# Patient Record
Sex: MALE | Race: WHITE | NOT HISPANIC OR LATINO
[De-identification: names, ages, dates, MRNs, and addresses within clinical notes are randomized per-mention and may not be internally consistent; named-entity substitution may affect disease eponyms.]

---

## 2018-07-25 ENCOUNTER — RECORD ABSTRACTING (OUTPATIENT)
Age: 83
End: 2018-07-25

## 2018-07-25 DIAGNOSIS — Z85.46 PERSONAL HISTORY OF MALIGNANT NEOPLASM OF PROSTATE: ICD-10-CM

## 2018-07-30 ENCOUNTER — RX RENEWAL (OUTPATIENT)
Age: 83
End: 2018-07-30

## 2018-08-15 ENCOUNTER — APPOINTMENT (OUTPATIENT)
Dept: PULMONOLOGY | Facility: CLINIC | Age: 83
End: 2018-08-15
Payer: MEDICARE

## 2018-08-15 VITALS
SYSTOLIC BLOOD PRESSURE: 166 MMHG | RESPIRATION RATE: 14 BRPM | WEIGHT: 220 LBS | HEIGHT: 70 IN | HEART RATE: 57 BPM | OXYGEN SATURATION: 96 % | DIASTOLIC BLOOD PRESSURE: 88 MMHG | BODY MASS INDEX: 31.5 KG/M2

## 2018-08-15 DIAGNOSIS — G47.10 HYPERSOMNIA, UNSPECIFIED: ICD-10-CM

## 2018-08-15 DIAGNOSIS — G47.30 HYPERSOMNIA, UNSPECIFIED: ICD-10-CM

## 2018-08-15 PROCEDURE — 94726 PLETHYSMOGRAPHY LUNG VOLUMES: CPT

## 2018-08-15 PROCEDURE — 99214 OFFICE O/P EST MOD 30 MIN: CPT | Mod: 25

## 2018-08-15 PROCEDURE — 94060 EVALUATION OF WHEEZING: CPT

## 2018-08-15 PROCEDURE — 94729 DIFFUSING CAPACITY: CPT

## 2018-08-15 RX ORDER — PREDNISONE 5 MG/1
5 TABLET ORAL
Qty: 60 | Refills: 0 | Status: ACTIVE | COMMUNITY
Start: 2018-02-14

## 2018-08-15 RX ORDER — OMEPRAZOLE 40 MG/1
40 CAPSULE, DELAYED RELEASE ORAL
Qty: 90 | Refills: 0 | Status: ACTIVE | COMMUNITY
Start: 2018-03-05

## 2018-08-15 RX ORDER — ABIRATERONE ACETATE 250 MG/1
250 TABLET ORAL
Qty: 120 | Refills: 0 | Status: ACTIVE | COMMUNITY
Start: 2018-01-19

## 2018-08-15 RX ORDER — FAMOTIDINE 40 MG/1
40 TABLET, FILM COATED ORAL
Qty: 30 | Refills: 0 | Status: DISCONTINUED | COMMUNITY
Start: 2018-01-24

## 2018-08-15 RX ORDER — BUDESONIDE 0.5 MG/2ML
0.5 INHALANT ORAL
Qty: 60 | Refills: 0 | Status: DISCONTINUED | COMMUNITY
Start: 2017-10-31

## 2018-08-15 RX ORDER — CRANBERRY FRUIT EXTRACT 650 MG
CAPSULE ORAL
Refills: 0 | Status: ACTIVE | COMMUNITY

## 2018-08-15 RX ORDER — TERAZOSIN 10 MG/1
10 CAPSULE ORAL
Qty: 90 | Refills: 0 | Status: DISCONTINUED | COMMUNITY
Start: 2018-06-07

## 2018-08-15 RX ORDER — GLUC/MSM/COLGN2/HYAL/ANTIARTH3 375-375-20
TABLET ORAL
Refills: 0 | Status: ACTIVE | COMMUNITY

## 2018-08-15 RX ORDER — DONEPEZIL HYDROCHLORIDE 10 MG/1
10 TABLET ORAL
Qty: 90 | Refills: 0 | Status: DISCONTINUED | COMMUNITY
Start: 2018-06-19

## 2018-08-19 ENCOUNTER — RX RENEWAL (OUTPATIENT)
Age: 83
End: 2018-08-19

## 2018-09-18 ENCOUNTER — RX RENEWAL (OUTPATIENT)
Age: 83
End: 2018-09-18

## 2018-10-09 ENCOUNTER — RX RENEWAL (OUTPATIENT)
Age: 83
End: 2018-10-09

## 2018-11-04 ENCOUNTER — RX RENEWAL (OUTPATIENT)
Age: 83
End: 2018-11-04

## 2018-11-30 ENCOUNTER — RX RENEWAL (OUTPATIENT)
Age: 83
End: 2018-11-30

## 2019-01-29 ENCOUNTER — RX RENEWAL (OUTPATIENT)
Age: 84
End: 2019-01-29

## 2019-02-13 ENCOUNTER — APPOINTMENT (OUTPATIENT)
Dept: PULMONOLOGY | Facility: CLINIC | Age: 84
End: 2019-02-13
Payer: MEDICARE

## 2019-02-13 VITALS
WEIGHT: 227 LBS | SYSTOLIC BLOOD PRESSURE: 127 MMHG | HEIGHT: 70 IN | RESPIRATION RATE: 14 BRPM | BODY MASS INDEX: 32.5 KG/M2 | OXYGEN SATURATION: 96 % | DIASTOLIC BLOOD PRESSURE: 88 MMHG | HEART RATE: 81 BPM

## 2019-02-13 PROCEDURE — 94618 PULMONARY STRESS TESTING: CPT

## 2019-02-13 PROCEDURE — 94060 EVALUATION OF WHEEZING: CPT | Mod: 59

## 2019-02-13 PROCEDURE — 71046 X-RAY EXAM CHEST 2 VIEWS: CPT

## 2019-02-13 PROCEDURE — 94727 GAS DIL/WSHOT DETER LNG VOL: CPT

## 2019-02-13 PROCEDURE — 94729 DIFFUSING CAPACITY: CPT

## 2019-02-13 PROCEDURE — 99214 OFFICE O/P EST MOD 30 MIN: CPT | Mod: 25

## 2019-02-13 NOTE — PHYSICAL EXAM
[General Appearance - Well Developed] : well developed [Normal Appearance] : normal appearance [Well Groomed] : well groomed [General Appearance - Well Nourished] : well nourished [No Deformities] : no deformities [General Appearance - In No Acute Distress] : no acute distress [Normal Conjunctiva] : the conjunctiva exhibited no abnormalities [Eyelids - No Xanthelasma] : the eyelids demonstrated no xanthelasmas [Normal Oropharynx] : abnormal oropharynx [Low Lying Soft Palate] : low lying soft palate [Elongated Uvula] : elongated uvula [Enlarged Base of the Tongue] : enlargement of the base of the tongue [Heart Rate And Rhythm] : heart rate and rhythm were normal [Heart Sounds] : normal S1 and S2 [Murmurs] : no murmurs present [Respiration, Rhythm And Depth] : normal respiratory rhythm and effort [Exaggerated Use Of Accessory Muscles For Inspiration] : no accessory muscle use [Auscultation Breath Sounds / Voice Sounds] : lungs were clear to auscultation bilaterally [Abdomen Soft] : soft [Abdomen Tenderness] : non-tender [Abdomen Mass (___ Cm)] : no abdominal mass palpated [Abnormal Walk] : normal gait [Gait - Sufficient For Exercise Testing] : the gait was sufficient for exercise testing [Skin Color & Pigmentation] : normal skin color and pigmentation [] : no rash [No Venous Stasis] : no venous stasis [Skin Lesions] : no skin lesions [No Skin Ulcers] : no skin ulcer [No Xanthoma] : no  xanthoma was observed [Deep Tendon Reflexes (DTR)] : deep tendon reflexes were 2+ and symmetric [Sensation] : the sensory exam was normal to light touch and pinprick [No Focal Deficits] : no focal deficits

## 2019-02-13 NOTE — PROCEDURE
[FreeTextEntry1] : Chest x-ray clear\par PFT minimal abnormalities.\par Exercise study shows mild oxygen desaturation with exertion

## 2019-02-13 NOTE — HISTORY OF PRESENT ILLNESS
[FreeTextEntry1] : Patient here for followup. 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 Chronic exertional dyspnea without improvement. Has tried inhalers in the past and experienced side effects.\par \par He does have a history of sleep apnea. He refused treatment for this in the past.

## 2019-05-01 ENCOUNTER — RX RENEWAL (OUTPATIENT)
Age: 84
End: 2019-05-01

## 2019-06-24 ENCOUNTER — RX RENEWAL (OUTPATIENT)
Age: 84
End: 2019-06-24

## 2019-07-08 ENCOUNTER — RX RENEWAL (OUTPATIENT)
Age: 84
End: 2019-07-08

## 2019-07-10 ENCOUNTER — APPOINTMENT (OUTPATIENT)
Dept: PULMONOLOGY | Facility: CLINIC | Age: 84
End: 2019-07-10
Payer: MEDICARE

## 2019-07-10 VITALS
DIASTOLIC BLOOD PRESSURE: 75 MMHG | TEMPERATURE: 97.4 F | HEIGHT: 70 IN | OXYGEN SATURATION: 96 % | BODY MASS INDEX: 32.35 KG/M2 | WEIGHT: 226 LBS | SYSTOLIC BLOOD PRESSURE: 136 MMHG | HEART RATE: 71 BPM

## 2019-07-10 DIAGNOSIS — J45.40 MODERATE PERSISTENT ASTHMA, UNCOMPLICATED: ICD-10-CM

## 2019-07-10 LAB — DEPRECATED D DIMER PPP IA-ACNC: <150 NG/ML DDU

## 2019-07-10 PROCEDURE — 94060 EVALUATION OF WHEEZING: CPT

## 2019-07-10 PROCEDURE — 94727 GAS DIL/WSHOT DETER LNG VOL: CPT

## 2019-07-10 PROCEDURE — 94729 DIFFUSING CAPACITY: CPT

## 2019-07-10 PROCEDURE — 99214 OFFICE O/P EST MOD 30 MIN: CPT | Mod: 25

## 2019-07-10 NOTE — PROCEDURE
[FreeTextEntry1] : PFT normal\par No oxygen desaturation noted with ambulation\par CT chest as noted above

## 2019-07-10 NOTE — HISTORY OF PRESENT ILLNESS
[FreeTextEntry1] : Patient here for evaluation of worsening shortness of breath and abnormal chest CT. History of pulmonary embolism and history of prostate cancer with chronic dyspnea. Dyspnea did not improve after treatment of thromboembolic disease. Had worsening symptoms without clear precipitant although it did occur after a brief prescription for Wellbutrin for depression. Was sent for CT angiogram which revealed new filling defect in right upper lobe.\par \par 2 days ago the patient's anticoagulant was changed from DOAC to Lovenox. No clear improvement in symptoms was noted with this change in fact the patient feels worse.

## 2019-07-10 NOTE — REVIEW OF SYSTEMS
[Dyspnea] : dyspnea [Cough] : no cough [Orthopnea] : orthopnea [As Noted in HPI] : as noted in HPI [Negative] : Sleep Disorder

## 2019-07-10 NOTE — PHYSICAL EXAM
[General Appearance - Well Developed] : well developed [Well Groomed] : well groomed [Normal Appearance] : normal appearance [General Appearance - In No Acute Distress] : no acute distress [No Deformities] : no deformities [General Appearance - Well Nourished] : well nourished [Eyelids - No Xanthelasma] : the eyelids demonstrated no xanthelasmas [Normal Conjunctiva] : the conjunctiva exhibited no abnormalities [Normal Oropharynx] : abnormal oropharynx [Low Lying Soft Palate] : low lying soft palate [Elongated Uvula] : elongated uvula [Heart Rate And Rhythm] : heart rate and rhythm were normal [Enlarged Base of the Tongue] : enlargement of the base of the tongue [Murmurs] : no murmurs present [Heart Sounds] : normal S1 and S2 [Respiration, Rhythm And Depth] : normal respiratory rhythm and effort [Exaggerated Use Of Accessory Muscles For Inspiration] : no accessory muscle use [Abdomen Tenderness] : non-tender [Auscultation Breath Sounds / Voice Sounds] : lungs were clear to auscultation bilaterally [Abdomen Soft] : soft [Abnormal Walk] : normal gait [Abdomen Mass (___ Cm)] : no abdominal mass palpated [Skin Color & Pigmentation] : normal skin color and pigmentation [No Venous Stasis] : no venous stasis [Gait - Sufficient For Exercise Testing] : the gait was sufficient for exercise testing [] : no rash [No Skin Ulcers] : no skin ulcer [Skin Lesions] : no skin lesions [No Xanthoma] : no  xanthoma was observed [Sensation] : the sensory exam was normal to light touch and pinprick [Deep Tendon Reflexes (DTR)] : deep tendon reflexes were 2+ and symmetric [No Focal Deficits] : no focal deficits

## 2019-07-10 NOTE — ASSESSMENT
[FreeTextEntry1] : Worsening shortness of breath etiology unclear. I have requested the CT discs for my review but I doubt pulmonary embolism as the cause of his shortness of breath. Even if it is the issue has been addressed by changing anticoagulant to Lovenox. Will obtain d-dimer and BNP and labs. Recommend reassessment by cardiology. If symptoms worsen recommend hospitalization

## 2019-07-11 LAB
ALBUMIN SERPL ELPH-MCNC: 4.1 G/DL
ALP BLD-CCNC: 43 U/L
ALT SERPL-CCNC: 11 U/L
ANION GAP SERPL CALC-SCNC: 11 MMOL/L
AST SERPL-CCNC: 16 U/L
BASOPHILS # BLD AUTO: 0.03 K/UL
BASOPHILS NFR BLD AUTO: 0.5 %
BILIRUB SERPL-MCNC: 0.9 MG/DL
BUN SERPL-MCNC: 16 MG/DL
CALCIUM SERPL-MCNC: 9.2 MG/DL
CHLORIDE SERPL-SCNC: 104 MMOL/L
CO2 SERPL-SCNC: 27 MMOL/L
CREAT SERPL-MCNC: 0.97 MG/DL
EOSINOPHIL # BLD AUTO: 0.1 K/UL
EOSINOPHIL NFR BLD AUTO: 1.8 %
GLUCOSE SERPL-MCNC: 153 MG/DL
HCT VFR BLD CALC: 42.4 %
HGB BLD-MCNC: 13.8 G/DL
IMM GRANULOCYTES NFR BLD AUTO: 0.2 %
LYMPHOCYTES # BLD AUTO: 0.47 K/UL
LYMPHOCYTES NFR BLD AUTO: 8.3 %
MAN DIFF?: NORMAL
MCHC RBC-ENTMCNC: 30.3 PG
MCHC RBC-ENTMCNC: 32.5 GM/DL
MCV RBC AUTO: 93.2 FL
MONOCYTES # BLD AUTO: 0.44 K/UL
MONOCYTES NFR BLD AUTO: 7.8 %
NEUTROPHILS # BLD AUTO: 4.59 K/UL
NEUTROPHILS NFR BLD AUTO: 81.4 %
NT-PROBNP SERPL-MCNC: 589 PG/ML
PLATELET # BLD AUTO: 137 K/UL
POTASSIUM SERPL-SCNC: 4 MMOL/L
PROT SERPL-MCNC: 6.2 G/DL
RBC # BLD: 4.55 M/UL
RBC # FLD: 15.4 %
SODIUM SERPL-SCNC: 142 MMOL/L
WBC # FLD AUTO: 5.64 K/UL

## 2019-07-18 ENCOUNTER — RX RENEWAL (OUTPATIENT)
Age: 84
End: 2019-07-18

## 2019-07-19 ENCOUNTER — RX RENEWAL (OUTPATIENT)
Age: 84
End: 2019-07-19

## 2019-07-19 RX ORDER — RIVAROXABAN 20 MG/1
20 TABLET, FILM COATED ORAL
Qty: 30 | Refills: 0 | Status: DISCONTINUED | COMMUNITY
Start: 2018-03-05 | End: 2019-07-19

## 2019-07-23 ENCOUNTER — APPOINTMENT (OUTPATIENT)
Dept: CARDIOLOGY | Facility: CLINIC | Age: 84
End: 2019-07-23
Payer: MEDICARE

## 2019-07-23 ENCOUNTER — NON-APPOINTMENT (OUTPATIENT)
Age: 84
End: 2019-07-23

## 2019-07-23 VITALS
BODY MASS INDEX: 31.35 KG/M2 | OXYGEN SATURATION: 95 % | HEART RATE: 84 BPM | WEIGHT: 219 LBS | SYSTOLIC BLOOD PRESSURE: 131 MMHG | DIASTOLIC BLOOD PRESSURE: 83 MMHG | HEIGHT: 70 IN

## 2019-07-23 PROCEDURE — 99205 OFFICE O/P NEW HI 60 MIN: CPT

## 2019-07-23 PROCEDURE — 93000 ELECTROCARDIOGRAM COMPLETE: CPT

## 2019-07-23 RX ORDER — ROSUVASTATIN CALCIUM 5 MG/1
5 TABLET, FILM COATED ORAL
Refills: 0 | Status: ACTIVE | COMMUNITY

## 2019-07-23 NOTE — HISTORY OF PRESENT ILLNESS
[FreeTextEntry1] : Juan Diego Mercado presented to the office today for a cardiovascular evaluation. He was last seen in .\par \par He is now 84 years old, with a history of smoking in the past. He does have a history of asbestos exposure. He has a history of coronary artery disease, status post PCI in .  At that time, his wife had just recently , and he had multiple symptoms including a sense of dyspnea, dizziness and malaise. He was evaluated at the hospital, where cardiac catheterization was performed. He was found to have double vessel CAD, for which double vessel PCI of the RCA and LAD was performed.  Catheterization was again performed in November, which revealed that the stents were patent.  At some point, given his shortness of breath, he saw a pulmonologist. More recently he has been seeing Dr. Tubbs.\par \par He has continued to experience shortness of breath since then, without any significant change. If anything, over the years, there has been some worsening. Because of a tendency toward hypoglycemia, he has been eating so as to maintain his blood sugar. He has gained perhaps 40 pounds in the last 2 years. His dyspnea with activity, and with bending over, have both increased over that period of time, at least to a certain extent. He denies orthopnea, PND and lower extremity edema. He denies palpitations, dizziness and syncope.\par \par When he saw me in , he reported dyspnea, progressive over the years. PCI could not meaningfully change the course of his dyspnea. At that time, I recommended an echocardiogram and nuclear stress testing. These did not reveal any significant abnormalities.  I decided that we would likely going to require cardiac catheterization to settle things more definitively, if he did not improve.\par \par He has been found to have pulmonary emboli recently, with a more recent pulmonary embolism despite the use of Xarelto. He has therefore been taking Lovenox. She does not report any clear angina, though his history is vague. He denies orthopnea, PND and edema. He denies palpitations, dizziness and syncope. He reports that he has dyspnea walking even 15 feet, or 2 steps in a flight of stairs. His pulmonologist does not believe that his symptoms are pulmonary in origin. He has not been told of atrial fibrillation.

## 2019-07-23 NOTE — PHYSICAL EXAM
[General Appearance - Well Developed] : well developed [Normal Appearance] : normal appearance [Well Groomed] : well groomed [General Appearance - Well Nourished] : well nourished [No Deformities] : no deformities [General Appearance - In No Acute Distress] : no acute distress [Normal Conjunctiva] : the conjunctiva exhibited no abnormalities [Eyelids - No Xanthelasma] : the eyelids demonstrated no xanthelasmas [Normal Oral Mucosa] : normal oral mucosa [No Oral Pallor] : no oral pallor [No Oral Cyanosis] : no oral cyanosis [Normal Jugular Venous V Waves Present] : normal jugular venous V waves present [Normal Jugular Venous A Waves Present] : normal jugular venous A waves present [No Jugular Venous Lyn A Waves] : no jugular venous lyn A waves [Respiration, Rhythm And Depth] : normal respiratory rhythm and effort [Exaggerated Use Of Accessory Muscles For Inspiration] : no accessory muscle use [Auscultation Breath Sounds / Voice Sounds] : lungs were clear to auscultation bilaterally [Abdomen Tenderness] : non-tender [Abdomen Soft] : soft [Abdomen Mass (___ Cm)] : no abdominal mass palpated [FreeTextEntry1] : Obese [Abnormal Walk] : normal gait [Gait - Sufficient For Exercise Testing] : the gait was sufficient for exercise testing [Nail Clubbing] : no clubbing of the fingernails [Cyanosis, Localized] : no localized cyanosis [Petechial Hemorrhages (___cm)] : no petechial hemorrhages [Skin Color & Pigmentation] : normal skin color and pigmentation [] : no rash [No Skin Ulcers] : no skin ulcer [No Venous Stasis] : no venous stasis [Skin Lesions] : no skin lesions [No Xanthoma] : no  xanthoma was observed [Oriented To Time, Place, And Person] : oriented to person, place, and time [Affect] : the affect was normal [Mood] : the mood was normal [No Anxiety] : not feeling anxious [Not Palpable] : not palpable [Normal Rate] : normal [Rhythm Regular] : regular [Normal S1] : normal S1 [Normal S2] : normal S2 [No Gallop] : no gallop heard [S3] : no S3 [S4] : no S4 [No Murmur] : no murmurs heard [Left Carotid Bruit] : no bruit heard over the left carotid [Right Carotid Bruit] : no bruit heard over the right carotid [Left Femoral Bruit] : no bruit heard over the left femoral artery [Right Femoral Bruit] : no bruit heard over the right femoral artery [2+] : right 2+ [No Pitting Edema] : no pitting edema present [Bruit] : no bruit heard

## 2019-07-23 NOTE — REASON FOR VISIT
[Consultation] : a consultation regarding [Coronary Artery Disease] : coronary artery disease [Dyspnea] : dyspnea

## 2019-07-23 NOTE — DISCUSSION/SUMMARY
[FreeTextEntry1] : Juan Diego has a history of coronary artery disease. He has had a history of obesity, which has been progressive. He has shortness of breath which has also been progressive.\par \par It remains unclear what the source of his shortness of breath is.  It may be a combination of obesity, deconditioning, pulmonary disease and structural heart disease. Certainly, it sounds as if revascularization did not help him in any material way, at least from back in 2012.  It isn't clear that this is all the same process.\par \par He is in atrial fibrillation, which may or may not be new. It is certainly new since 2015. I have suggested a Holter, as well as repeating his echocardiogram. I have requested records from his primary care physician as well.\par \par We will need to decide upon ischemic evaluation, which may be best performed either with stress testing or with catheterization. Catheterization may be more desirable, especially if a right heart catheterization is included.  He will perform the echo and Holter, and see me in about 6 weeks.

## 2019-07-24 ENCOUNTER — APPOINTMENT (OUTPATIENT)
Dept: PULMONOLOGY | Facility: CLINIC | Age: 84
End: 2019-07-24
Payer: MEDICARE

## 2019-07-24 VITALS
DIASTOLIC BLOOD PRESSURE: 80 MMHG | HEIGHT: 70 IN | WEIGHT: 221 LBS | BODY MASS INDEX: 31.64 KG/M2 | OXYGEN SATURATION: 97 % | HEART RATE: 82 BPM | SYSTOLIC BLOOD PRESSURE: 132 MMHG

## 2019-07-24 PROCEDURE — 99214 OFFICE O/P EST MOD 30 MIN: CPT

## 2019-07-24 NOTE — REVIEW OF SYSTEMS
[Cough] : no cough [Dyspnea] : dyspnea [As Noted in HPI] : as noted in HPI [Orthopnea] : orthopnea [Negative] : Sleep Disorder

## 2019-07-24 NOTE — REASON FOR VISIT
[Follow-Up] : a follow-up visit [Pulmonary Embolism] : pulmonary embolism [Shortness of Breath] : shortness of Breath

## 2019-07-24 NOTE — HISTORY OF PRESENT ILLNESS
[FreeTextEntry1] : PRIOR: Patient here for evaluation of worsening shortness of breath and abnormal chest CT. History of pulmonary embolism and history of prostate cancer with chronic dyspnea. Dyspnea did not improve after treatment of thromboembolic disease. Had worsening symptoms without clear precipitant although it did occur after a brief prescription for Wellbutrin for depression. Was sent for CT angiogram which revealed new filling defect in right upper lobe.\par \par 2 days ago the patient's anticoagulant was changed from DOAC to Lovenox. No clear improvement in symptoms was noted with this change in fact the patient feels worse.\par \par CURRENT: No change in dyspnea with change in anticoagulant. Remains short of breath with minimal exertion. Saw cardiology workup in progress found to have atrial fibrillation; possible cardiac catheterization.\par \par

## 2019-07-24 NOTE — ASSESSMENT
[FreeTextEntry1] : Do not have full explanation for worsening dyspnea. May be combination of hormonal effects as well as deconditioning. Will have CT chest reviewed to see if indeed there was a new pulmonary embolism. If this is negative we'll consider diuretic trial although cardiology not enthusiastic that this will make a difference.

## 2019-07-24 NOTE — PHYSICAL EXAM
[General Appearance - Well Developed] : well developed [Normal Appearance] : normal appearance [Well Groomed] : well groomed [General Appearance - Well Nourished] : well nourished [No Deformities] : no deformities [General Appearance - In No Acute Distress] : no acute distress [Normal Conjunctiva] : the conjunctiva exhibited no abnormalities [Eyelids - No Xanthelasma] : the eyelids demonstrated no xanthelasmas [Normal Oropharynx] : abnormal oropharynx [Low Lying Soft Palate] : low lying soft palate [Elongated Uvula] : elongated uvula [Enlarged Base of the Tongue] : enlargement of the base of the tongue [Heart Sounds] : normal S1 and S2 [Heart Rate And Rhythm] : heart rate and rhythm were normal [Murmurs] : no murmurs present [Exaggerated Use Of Accessory Muscles For Inspiration] : no accessory muscle use [Respiration, Rhythm And Depth] : normal respiratory rhythm and effort [Auscultation Breath Sounds / Voice Sounds] : lungs were clear to auscultation bilaterally [Abdomen Soft] : soft [Abdomen Tenderness] : non-tender [Abdomen Mass (___ Cm)] : no abdominal mass palpated [Abnormal Walk] : normal gait [Gait - Sufficient For Exercise Testing] : the gait was sufficient for exercise testing [] : no rash [Skin Color & Pigmentation] : normal skin color and pigmentation [No Venous Stasis] : no venous stasis [Skin Lesions] : no skin lesions [No Xanthoma] : no  xanthoma was observed [No Skin Ulcers] : no skin ulcer [Deep Tendon Reflexes (DTR)] : deep tendon reflexes were 2+ and symmetric [Sensation] : the sensory exam was normal to light touch and pinprick [No Focal Deficits] : no focal deficits

## 2019-08-01 ENCOUNTER — APPOINTMENT (OUTPATIENT)
Dept: CARDIOLOGY | Facility: CLINIC | Age: 84
End: 2019-08-01

## 2019-08-02 ENCOUNTER — APPOINTMENT (OUTPATIENT)
Dept: CARDIOLOGY | Facility: CLINIC | Age: 84
End: 2019-08-02
Payer: MEDICARE

## 2019-08-02 PROCEDURE — 93306 TTE W/DOPPLER COMPLETE: CPT

## 2019-08-07 ENCOUNTER — APPOINTMENT (OUTPATIENT)
Dept: PULMONOLOGY | Facility: CLINIC | Age: 84
End: 2019-08-07

## 2019-08-21 ENCOUNTER — APPOINTMENT (OUTPATIENT)
Dept: CARDIOLOGY | Facility: CLINIC | Age: 84
End: 2019-08-21
Payer: MEDICARE

## 2019-08-21 ENCOUNTER — APPOINTMENT (OUTPATIENT)
Dept: PULMONOLOGY | Facility: CLINIC | Age: 84
End: 2019-08-21
Payer: MEDICARE

## 2019-08-21 VITALS
WEIGHT: 221 LBS | BODY MASS INDEX: 31.64 KG/M2 | HEIGHT: 70 IN | SYSTOLIC BLOOD PRESSURE: 119 MMHG | DIASTOLIC BLOOD PRESSURE: 71 MMHG | HEART RATE: 86 BPM | OXYGEN SATURATION: 95 %

## 2019-08-21 PROCEDURE — 99214 OFFICE O/P EST MOD 30 MIN: CPT

## 2019-08-21 NOTE — PHYSICAL EXAM
[Normal Appearance] : normal appearance [General Appearance - Well Developed] : well developed [Well Groomed] : well groomed [General Appearance - Well Nourished] : well nourished [No Deformities] : no deformities [Normal Conjunctiva] : the conjunctiva exhibited no abnormalities [General Appearance - In No Acute Distress] : no acute distress [Eyelids - No Xanthelasma] : the eyelids demonstrated no xanthelasmas [Normal Oropharynx] : abnormal oropharynx [Low Lying Soft Palate] : low lying soft palate [Elongated Uvula] : elongated uvula [Enlarged Base of the Tongue] : enlargement of the base of the tongue [Heart Rate And Rhythm] : heart rate and rhythm were normal [Murmurs] : no murmurs present [Heart Sounds] : normal S1 and S2 [Respiration, Rhythm And Depth] : normal respiratory rhythm and effort [Exaggerated Use Of Accessory Muscles For Inspiration] : no accessory muscle use [Auscultation Breath Sounds / Voice Sounds] : lungs were clear to auscultation bilaterally [Abdomen Tenderness] : non-tender [Abdomen Soft] : soft [Abdomen Mass (___ Cm)] : no abdominal mass palpated [Gait - Sufficient For Exercise Testing] : the gait was sufficient for exercise testing [Abnormal Walk] : normal gait [Skin Color & Pigmentation] : normal skin color and pigmentation [] : no rash [No Skin Ulcers] : no skin ulcer [No Venous Stasis] : no venous stasis [Skin Lesions] : no skin lesions [Deep Tendon Reflexes (DTR)] : deep tendon reflexes were 2+ and symmetric [No Xanthoma] : no  xanthoma was observed [Sensation] : the sensory exam was normal to light touch and pinprick [No Focal Deficits] : no focal deficits

## 2019-08-21 NOTE — ASSESSMENT
[FreeTextEntry1] : Etiology of shortness of breath unclear. Will repeat CT angiography; if evidence of progressive thromboembolic disease will need to change form of anticoagulation once again probably in the context of hospitalization. This is unlikely as there is no evidence of pulmonary hypertension. I think it more likely that his dyspnea has to do with his hormonal  agents for his prostate cancer. He may have a difficult decision to make regarding balancing his symptoms which are disabling versus the effect of treatment for his cancer.

## 2019-08-21 NOTE — HISTORY OF PRESENT ILLNESS
[FreeTextEntry1] : History of prostate cancer and pulmonary embolism. Recent CT chest showed new pulmonary embolism. Patient changed from oral DOAC to enoxaparin. No change in dyspnea reported. Recent cardiac workup negative although workup for arrhythmia still ongoing. Still very short of breath. Tried nebulizer therapy actually felt worse

## 2019-08-27 ENCOUNTER — FORM ENCOUNTER (OUTPATIENT)
Age: 84
End: 2019-08-27

## 2019-08-28 ENCOUNTER — OUTPATIENT (OUTPATIENT)
Dept: OUTPATIENT SERVICES | Facility: HOSPITAL | Age: 84
LOS: 1 days | End: 2019-08-28
Payer: MEDICARE

## 2019-08-28 ENCOUNTER — APPOINTMENT (OUTPATIENT)
Dept: CT IMAGING | Facility: CLINIC | Age: 84
End: 2019-08-28
Payer: MEDICARE

## 2019-08-28 DIAGNOSIS — Z00.8 ENCOUNTER FOR OTHER GENERAL EXAMINATION: ICD-10-CM

## 2019-08-28 PROCEDURE — 82565 ASSAY OF CREATININE: CPT

## 2019-08-28 PROCEDURE — 71275 CT ANGIOGRAPHY CHEST: CPT

## 2019-08-28 PROCEDURE — 71275 CT ANGIOGRAPHY CHEST: CPT | Mod: 26

## 2019-08-30 PROCEDURE — 93224 XTRNL ECG REC UP TO 48 HRS: CPT

## 2019-09-03 ENCOUNTER — NON-APPOINTMENT (OUTPATIENT)
Age: 84
End: 2019-09-03

## 2019-09-03 ENCOUNTER — APPOINTMENT (OUTPATIENT)
Dept: CARDIOLOGY | Facility: CLINIC | Age: 84
End: 2019-09-03
Payer: MEDICARE

## 2019-09-03 ENCOUNTER — OTHER (OUTPATIENT)
Age: 84
End: 2019-09-03

## 2019-09-03 VITALS
HEART RATE: 92 BPM | SYSTOLIC BLOOD PRESSURE: 120 MMHG | DIASTOLIC BLOOD PRESSURE: 76 MMHG | WEIGHT: 218 LBS | HEIGHT: 70 IN | OXYGEN SATURATION: 97 % | BODY MASS INDEX: 31.21 KG/M2

## 2019-09-03 PROCEDURE — 99215 OFFICE O/P EST HI 40 MIN: CPT

## 2019-09-03 PROCEDURE — 93000 ELECTROCARDIOGRAM COMPLETE: CPT

## 2019-09-03 NOTE — PHYSICAL EXAM
[General Appearance - Well Developed] : well developed [Normal Appearance] : normal appearance [Well Groomed] : well groomed [General Appearance - Well Nourished] : well nourished [No Deformities] : no deformities [General Appearance - In No Acute Distress] : no acute distress [Normal Conjunctiva] : the conjunctiva exhibited no abnormalities [Eyelids - No Xanthelasma] : the eyelids demonstrated no xanthelasmas [Normal Oral Mucosa] : normal oral mucosa [No Oral Pallor] : no oral pallor [No Oral Cyanosis] : no oral cyanosis [Normal Jugular Venous A Waves Present] : normal jugular venous A waves present [Normal Jugular Venous V Waves Present] : normal jugular venous V waves present [No Jugular Venous Lyn A Waves] : no jugular venous lyn A waves [Respiration, Rhythm And Depth] : normal respiratory rhythm and effort [Exaggerated Use Of Accessory Muscles For Inspiration] : no accessory muscle use [Auscultation Breath Sounds / Voice Sounds] : lungs were clear to auscultation bilaterally [Abdomen Soft] : soft [Abdomen Tenderness] : non-tender [Abdomen Mass (___ Cm)] : no abdominal mass palpated [FreeTextEntry1] : Obese [Abnormal Walk] : normal gait [Nail Clubbing] : no clubbing of the fingernails [Gait - Sufficient For Exercise Testing] : the gait was sufficient for exercise testing [Cyanosis, Localized] : no localized cyanosis [Petechial Hemorrhages (___cm)] : no petechial hemorrhages [Skin Color & Pigmentation] : normal skin color and pigmentation [] : no rash [No Venous Stasis] : no venous stasis [No Skin Ulcers] : no skin ulcer [Skin Lesions] : no skin lesions [No Xanthoma] : no  xanthoma was observed [Oriented To Time, Place, And Person] : oriented to person, place, and time [Mood] : the mood was normal [Affect] : the affect was normal [No Anxiety] : not feeling anxious [Not Palpable] : not palpable [Normal Rate] : normal [Irregularly Irregular] : irregularly irregular [Normal S1] : normal S1 [Normal S2] : normal S2 [No Gallop] : no gallop heard [S3] : no S3 [S4] : no S4 [No Murmur] : no murmurs heard [Right Carotid Bruit] : no bruit heard over the right carotid [Right Femoral Bruit] : no bruit heard over the right femoral artery [Left Carotid Bruit] : no bruit heard over the left carotid [Left Femoral Bruit] : no bruit heard over the left femoral artery [2+] : left 2+ [Bruit] : no bruit heard [No Pitting Edema] : no pitting edema present

## 2019-09-03 NOTE — HISTORY OF PRESENT ILLNESS
[FreeTextEntry1] : Juan Diego Mercado presented to the office today for a cardiovascular evaluation. He was last seen in the office last month.\par \par He is now 84 years old, with a history of smoking in the past. He does have a history of asbestos exposure. He has a history of coronary artery disease, status post PCI in .  At that time, his wife had just recently , and he had multiple symptoms including a sense of dyspnea, dizziness and malaise. He was evaluated at the hospital, where cardiac catheterization was performed. He was found to have double vessel CAD, for which double vessel PCI of the RCA and LAD was performed.  Catheterization was again performed in November, which revealed that the stents were patent.  At some point, given his shortness of breath, he saw a pulmonologist. More recently he has been seeing Dr. Tubbs, who has been treating incidentally noted venous thromboembolic disease, for which he had been on Xarelto.\par \par When he saw me in , he reported dyspnea, progressive over the years. PCI did not meaningfully change the course of his dyspnea. At that time, I recommended an echocardiogram and nuclear stress testing. These did not reveal any significant abnormalities.  I decided that we were likely going to require cardiac catheterization to settle things more definitively, if he did not improve.\par \par He has been found to have pulmonary emboli recently, with a more recent pulmonary embolism despite the use of Xarelto. He has therefore been taking Lovenox. He does not report any clear angina, though his history is vague. He denies orthopnea, PND and edema. He denies palpitations, dizziness and syncope. He reports that he has dyspnea walking even 15 feet, or 2 steps in a flight of stairs. \par \par At the time, I found him to be in atrial fibrillation, which was a new diagnosis. I recommend a Holter monitor and an echocardiogram. His echocardiogram revealed a normal ejection fraction without significant pulmonary hypertension. Holter monitoring revealed reconsult atrial fibrillation as the predominant rhythm, with slow sinus rhythm occasionally.\par \par He continues to feel short of breath. He's had yet another CT scan recently given the persistent dyspnea, which does not reveal any evidence of pulmonary embolism. He reports severe shortness of breath, even with carving meat at the table. There has been discussion about the possibility that this represents a side effect from his hormonal therapy for prostate cancer.

## 2019-09-03 NOTE — DISCUSSION/SUMMARY
[FreeTextEntry1] : Juan Diego has a history of coronary artery disease. He has had a history of obesity, which has been progressive. He has shortness of breath which has also been progressive.\par \par It remains unclear what the source of his shortness of breath is.  It may be a combination of obesity, deconditioning, pulmonary disease and structural heart disease. Certainly, it sounds as if revascularization did not help him in any material way, at least from back in 2012.  It is still not clear that this is all the same process.\par \par He remains in rate controlled atrial fibrillation. It turns out that this was present at least as far back as December, 2018. \par \par After much discussion, I think it is reasonable to pursue cardiac catheterization. We need to define his coronary anatomy definitively, before we decide that we're going to say his shortness of breath is from Lupron injections. A right and left heart catheterization will be scheduled, and performed as soon as possible. We will discuss the results afterward.

## 2019-09-13 ENCOUNTER — TRANSCRIPTION ENCOUNTER (OUTPATIENT)
Age: 84
End: 2019-09-13

## 2019-09-13 ENCOUNTER — OUTPATIENT (OUTPATIENT)
Dept: OUTPATIENT SERVICES | Facility: HOSPITAL | Age: 84
LOS: 1 days | End: 2019-09-13
Payer: MEDICARE

## 2019-09-13 VITALS
HEART RATE: 56 BPM | HEIGHT: 70 IN | OXYGEN SATURATION: 98 % | WEIGHT: 220.9 LBS | DIASTOLIC BLOOD PRESSURE: 63 MMHG | SYSTOLIC BLOOD PRESSURE: 138 MMHG | RESPIRATION RATE: 17 BRPM | TEMPERATURE: 99 F

## 2019-09-13 DIAGNOSIS — Z90.49 ACQUIRED ABSENCE OF OTHER SPECIFIED PARTS OF DIGESTIVE TRACT: Chronic | ICD-10-CM

## 2019-09-13 DIAGNOSIS — Z95.5 PRESENCE OF CORONARY ANGIOPLASTY IMPLANT AND GRAFT: Chronic | ICD-10-CM

## 2019-09-13 DIAGNOSIS — R06.02 SHORTNESS OF BREATH: ICD-10-CM

## 2019-09-13 LAB
ALBUMIN SERPL ELPH-MCNC: 4.3 G/DL — SIGNIFICANT CHANGE UP (ref 3.3–5)
ALP SERPL-CCNC: 43 U/L — SIGNIFICANT CHANGE UP (ref 40–120)
ALT FLD-CCNC: 13 U/L — SIGNIFICANT CHANGE UP (ref 10–45)
ANION GAP SERPL CALC-SCNC: 11 MMOL/L — SIGNIFICANT CHANGE UP (ref 5–17)
APTT BLD: 39.5 SEC — HIGH (ref 27.5–36.3)
AST SERPL-CCNC: 12 U/L — SIGNIFICANT CHANGE UP (ref 10–40)
BILIRUB SERPL-MCNC: 0.7 MG/DL — SIGNIFICANT CHANGE UP (ref 0.2–1.2)
BUN SERPL-MCNC: 13 MG/DL — SIGNIFICANT CHANGE UP (ref 7–23)
CALCIUM SERPL-MCNC: 9.8 MG/DL — SIGNIFICANT CHANGE UP (ref 8.4–10.5)
CHLORIDE SERPL-SCNC: 99 MMOL/L — SIGNIFICANT CHANGE UP (ref 96–108)
CO2 SERPL-SCNC: 29 MMOL/L — SIGNIFICANT CHANGE UP (ref 22–31)
CREAT SERPL-MCNC: 0.9 MG/DL — SIGNIFICANT CHANGE UP (ref 0.5–1.3)
GLUCOSE SERPL-MCNC: 113 MG/DL — HIGH (ref 70–99)
HCT VFR BLD CALC: 39.2 % — SIGNIFICANT CHANGE UP (ref 39–50)
HGB BLD-MCNC: 12.1 G/DL — LOW (ref 13–17)
INR BLD: 1.02 RATIO — SIGNIFICANT CHANGE UP (ref 0.88–1.16)
MCHC RBC-ENTMCNC: 28.4 PG — SIGNIFICANT CHANGE UP (ref 27–34)
MCHC RBC-ENTMCNC: 30.9 GM/DL — LOW (ref 32–36)
MCV RBC AUTO: 91.9 FL — SIGNIFICANT CHANGE UP (ref 80–100)
PLATELET # BLD AUTO: 118 K/UL — LOW (ref 150–400)
POTASSIUM SERPL-MCNC: 4 MMOL/L — SIGNIFICANT CHANGE UP (ref 3.5–5.3)
POTASSIUM SERPL-SCNC: 4 MMOL/L — SIGNIFICANT CHANGE UP (ref 3.5–5.3)
PROT SERPL-MCNC: 6.8 G/DL — SIGNIFICANT CHANGE UP (ref 6–8.3)
PROTHROM AB SERPL-ACNC: 11.6 SEC — SIGNIFICANT CHANGE UP (ref 10–12.9)
RBC # BLD: 4.27 M/UL — SIGNIFICANT CHANGE UP (ref 4.2–5.8)
RBC # FLD: 14.8 % — HIGH (ref 10.3–14.5)
SODIUM SERPL-SCNC: 139 MMOL/L — SIGNIFICANT CHANGE UP (ref 135–145)
WBC # BLD: 5.4 K/UL — SIGNIFICANT CHANGE UP (ref 3.8–10.5)
WBC # FLD AUTO: 5.4 K/UL — SIGNIFICANT CHANGE UP (ref 3.8–10.5)

## 2019-09-13 PROCEDURE — 85610 PROTHROMBIN TIME: CPT

## 2019-09-13 PROCEDURE — 80053 COMPREHEN METABOLIC PANEL: CPT

## 2019-09-13 PROCEDURE — 93005 ELECTROCARDIOGRAM TRACING: CPT

## 2019-09-13 PROCEDURE — 99152 MOD SED SAME PHYS/QHP 5/>YRS: CPT

## 2019-09-13 PROCEDURE — 85730 THROMBOPLASTIN TIME PARTIAL: CPT

## 2019-09-13 PROCEDURE — 93010 ELECTROCARDIOGRAM REPORT: CPT

## 2019-09-13 PROCEDURE — C1889: CPT

## 2019-09-13 PROCEDURE — 93460 R&L HRT ART/VENTRICLE ANGIO: CPT

## 2019-09-13 PROCEDURE — 93460 R&L HRT ART/VENTRICLE ANGIO: CPT | Mod: 26,GC

## 2019-09-13 PROCEDURE — 85027 COMPLETE CBC AUTOMATED: CPT

## 2019-09-13 PROCEDURE — C1894: CPT

## 2019-09-13 PROCEDURE — C1887: CPT

## 2019-09-13 PROCEDURE — 99152 MOD SED SAME PHYS/QHP 5/>YRS: CPT | Mod: GC

## 2019-09-13 PROCEDURE — 99153 MOD SED SAME PHYS/QHP EA: CPT

## 2019-09-13 PROCEDURE — C1769: CPT

## 2019-09-13 RX ORDER — ENOXAPARIN SODIUM 100 MG/ML
1 INJECTION SUBCUTANEOUS
Qty: 0 | Refills: 0 | DISCHARGE

## 2019-09-13 RX ORDER — DONEPEZIL HYDROCHLORIDE 10 MG/1
1 TABLET, FILM COATED ORAL
Qty: 0 | Refills: 0 | DISCHARGE

## 2019-09-13 RX ORDER — MONTELUKAST 4 MG/1
1 TABLET, CHEWABLE ORAL
Qty: 0 | Refills: 0 | DISCHARGE

## 2019-09-13 RX ORDER — FINASTERIDE 5 MG/1
1 TABLET, FILM COATED ORAL
Qty: 0 | Refills: 0 | DISCHARGE

## 2019-09-13 RX ORDER — FAMOTIDINE 10 MG/ML
1 INJECTION INTRAVENOUS
Qty: 0 | Refills: 0 | DISCHARGE

## 2019-09-13 RX ORDER — MULTIVIT-MIN/FERROUS GLUCONATE 9 MG/15 ML
2 LIQUID (ML) ORAL
Qty: 0 | Refills: 0 | DISCHARGE

## 2019-09-13 RX ORDER — ABIRATERONE ACETATE 250 MG/1
4 TABLET ORAL
Qty: 0 | Refills: 0 | DISCHARGE

## 2019-09-13 RX ORDER — SODIUM CHLORIDE 9 MG/ML
3 INJECTION INTRAMUSCULAR; INTRAVENOUS; SUBCUTANEOUS EVERY 8 HOURS
Refills: 0 | Status: DISCONTINUED | OUTPATIENT
Start: 2019-09-13 | End: 2019-10-04

## 2019-09-13 RX ORDER — TERAZOSIN HYDROCHLORIDE 10 MG/1
1 CAPSULE ORAL
Qty: 0 | Refills: 0 | DISCHARGE

## 2019-09-13 RX ORDER — ROSUVASTATIN CALCIUM 5 MG/1
1 TABLET ORAL
Qty: 0 | Refills: 0 | DISCHARGE

## 2019-09-13 RX ORDER — OMEPRAZOLE 10 MG/1
1 CAPSULE, DELAYED RELEASE ORAL
Qty: 0 | Refills: 0 | DISCHARGE

## 2019-09-13 NOTE — H&P CARDIOLOGY - PMH
Atrial fibrillation    CAD (coronary artery disease)  3 stents  COPD (chronic obstructive pulmonary disease)    Former smoker    Hard of hearing    HLD (hyperlipidemia)    Hypoglycemia  resolved  Obesity    LISA (obstructive sleep apnea)    Prostate cancer    PUD (peptic ulcer disease)  remote  Pulmonary embolism  on lovenox

## 2019-09-13 NOTE — H&P CARDIOLOGY - FAMILY HISTORY
Sibling  Still living? No  Family history of heart disease in brother, Age at diagnosis: Age Unknown

## 2019-09-13 NOTE — H&P CARDIOLOGY - HISTORY OF PRESENT ILLNESS
84 yr old  male with PMH of Hard of Hearing, obesity, former smoker, asbestos exposure, asthma, COPD, LISA, CAD with 3 prior stents in 2012 RCA + LAD, HLD, A Fib, no implantable cardiac monitoring devices, PUD remote, prostate ca - s/p RT, PE - was on Xarelto when he developed another PE- now on LOVENOX (last dose 10pm yesterday), hypoglycemia in past presents today for cardiac catheterization.  Pt reports chronic progressive JARQUIN. Evaluated by Dr Leung and R+L heart cath recommended. 84 yr old  male with PMH of Hard of Hearing, obesity, former smoker, asbestos exposure, asthma, COPD, LISA, CAD with 3 prior stents in 2012 RCA + LAD, HLD, A Fib, no implantable cardiac monitoring devices, PUD remote, prostate ca - s/p RT, PE - was on Xarelto when he developed another PE- now on LOVENOX (last dose 10pm yesterday), hypoglycemia in past presents today for cardiac catheterization. Pt reports chronic progressive JARQUIN. Evaluated by Dr Leung and R+L heart cath recommended. 84 yr old  male with PMH of Hard of Hearing, obesity, former smoker, asbestos exposure, asthma, COPD, LISA, CAD with 3 prior stents in 2012 RCA + LAD, HLD, A Fib, no implantable cardiac monitoring devices, PUD remote, prostate ca - s/p RT, PE - was on Xarelto when he developed another PE- now on LOVENOX (last dose 10pm yesterday), hypoglycemia in past presents today for cardiac catheterization. Pt reports chronic progressive JARQUIN. Evaluated by Dr Leung and R+L heart cath recommended. Last ST 2015

## 2019-09-27 ENCOUNTER — RX RENEWAL (OUTPATIENT)
Age: 84
End: 2019-09-27

## 2019-09-27 PROBLEM — J44.9 CHRONIC OBSTRUCTIVE PULMONARY DISEASE, UNSPECIFIED: Chronic | Status: ACTIVE | Noted: 2019-09-13

## 2019-09-27 PROBLEM — G47.33 OBSTRUCTIVE SLEEP APNEA (ADULT) (PEDIATRIC): Chronic | Status: ACTIVE | Noted: 2019-09-13

## 2019-09-27 PROBLEM — Z87.891 PERSONAL HISTORY OF NICOTINE DEPENDENCE: Chronic | Status: ACTIVE | Noted: 2019-09-13

## 2019-09-27 PROBLEM — K27.9 PEPTIC ULCER, SITE UNSPECIFIED, UNSPECIFIED AS ACUTE OR CHRONIC, WITHOUT HEMORRHAGE OR PERFORATION: Chronic | Status: ACTIVE | Noted: 2019-09-13

## 2019-09-27 PROBLEM — C61 MALIGNANT NEOPLASM OF PROSTATE: Chronic | Status: ACTIVE | Noted: 2019-09-13

## 2019-09-27 PROBLEM — I26.99 OTHER PULMONARY EMBOLISM WITHOUT ACUTE COR PULMONALE: Chronic | Status: ACTIVE | Noted: 2019-09-13

## 2019-09-27 PROBLEM — E66.9 OBESITY, UNSPECIFIED: Chronic | Status: ACTIVE | Noted: 2019-09-13

## 2019-09-27 PROBLEM — E16.2 HYPOGLYCEMIA, UNSPECIFIED: Chronic | Status: ACTIVE | Noted: 2019-09-13

## 2019-09-27 PROBLEM — I25.10 ATHEROSCLEROTIC HEART DISEASE OF NATIVE CORONARY ARTERY WITHOUT ANGINA PECTORIS: Chronic | Status: ACTIVE | Noted: 2019-09-13

## 2019-09-27 PROBLEM — E78.5 HYPERLIPIDEMIA, UNSPECIFIED: Chronic | Status: ACTIVE | Noted: 2019-09-13

## 2019-09-27 PROBLEM — I48.91 UNSPECIFIED ATRIAL FIBRILLATION: Chronic | Status: ACTIVE | Noted: 2019-09-13

## 2019-09-27 PROBLEM — H91.90 UNSPECIFIED HEARING LOSS, UNSPECIFIED EAR: Chronic | Status: ACTIVE | Noted: 2019-09-13

## 2019-10-02 ENCOUNTER — APPOINTMENT (OUTPATIENT)
Dept: PULMONOLOGY | Facility: CLINIC | Age: 84
End: 2019-10-02
Payer: MEDICARE

## 2019-10-02 VITALS
OXYGEN SATURATION: 95 % | HEART RATE: 91 BPM | WEIGHT: 218 LBS | RESPIRATION RATE: 14 BRPM | DIASTOLIC BLOOD PRESSURE: 83 MMHG | SYSTOLIC BLOOD PRESSURE: 124 MMHG | BODY MASS INDEX: 31.21 KG/M2 | HEIGHT: 70 IN

## 2019-10-02 DIAGNOSIS — I48.91 UNSPECIFIED ATRIAL FIBRILLATION: ICD-10-CM

## 2019-10-02 PROCEDURE — 99213 OFFICE O/P EST LOW 20 MIN: CPT

## 2019-10-02 NOTE — ASSESSMENT
[FreeTextEntry1] : Unexplained dyspnea\par Recommend change in hormonal therapy for prostate cancer.\par \par Have submitted chest CTs for outside review but have not heard back

## 2019-10-02 NOTE — HISTORY OF PRESENT ILLNESS
[FreeTextEntry1] : History of incidental pulmonary embolus\par \par Unexplained shortness of breath. Underwent cardiac catheterization which revealed occluded stent but this not felt to be cause of dyspnea\par \par

## 2019-10-02 NOTE — PHYSICAL EXAM
[General Appearance - Well Developed] : well developed [Normal Appearance] : normal appearance [Well Groomed] : well groomed [General Appearance - Well Nourished] : well nourished [No Deformities] : no deformities [General Appearance - In No Acute Distress] : no acute distress [Normal Conjunctiva] : the conjunctiva exhibited no abnormalities [Eyelids - No Xanthelasma] : the eyelids demonstrated no xanthelasmas [Normal Oropharynx] : abnormal oropharynx [Low Lying Soft Palate] : low lying soft palate [Elongated Uvula] : elongated uvula [Enlarged Base of the Tongue] : enlargement of the base of the tongue [Heart Rate And Rhythm] : heart rate and rhythm were normal [Heart Sounds] : normal S1 and S2 [Murmurs] : no murmurs present [Respiration, Rhythm And Depth] : normal respiratory rhythm and effort [Exaggerated Use Of Accessory Muscles For Inspiration] : no accessory muscle use [Auscultation Breath Sounds / Voice Sounds] : lungs were clear to auscultation bilaterally [Abdomen Soft] : soft [Abdomen Tenderness] : non-tender [Abdomen Mass (___ Cm)] : no abdominal mass palpated [Abnormal Walk] : normal gait [Gait - Sufficient For Exercise Testing] : the gait was sufficient for exercise testing [Skin Color & Pigmentation] : normal skin color and pigmentation [] : no rash [No Venous Stasis] : no venous stasis [No Skin Ulcers] : no skin ulcer [Skin Lesions] : no skin lesions [No Xanthoma] : no  xanthoma was observed [Deep Tendon Reflexes (DTR)] : deep tendon reflexes were 2+ and symmetric [Sensation] : the sensory exam was normal to light touch and pinprick [No Focal Deficits] : no focal deficits

## 2019-10-08 ENCOUNTER — NON-APPOINTMENT (OUTPATIENT)
Age: 84
End: 2019-10-08

## 2019-10-08 ENCOUNTER — APPOINTMENT (OUTPATIENT)
Dept: CARDIOLOGY | Facility: CLINIC | Age: 84
End: 2019-10-08
Payer: MEDICARE

## 2019-10-08 VITALS
OXYGEN SATURATION: 97 % | HEIGHT: 70 IN | DIASTOLIC BLOOD PRESSURE: 80 MMHG | WEIGHT: 219 LBS | HEART RATE: 62 BPM | BODY MASS INDEX: 31.35 KG/M2 | SYSTOLIC BLOOD PRESSURE: 146 MMHG

## 2019-10-08 PROCEDURE — 99215 OFFICE O/P EST HI 40 MIN: CPT

## 2019-10-08 PROCEDURE — 93000 ELECTROCARDIOGRAM COMPLETE: CPT

## 2019-10-08 NOTE — HISTORY OF PRESENT ILLNESS
[FreeTextEntry1] : Juan Diego Mercado presented to the office today for a cardiovascular evaluation. He was last seen in the office last month.\par \par He is now 84 years old, with a history of smoking in the past. He does have a history of asbestos exposure. He has a history of coronary artery disease, status post PCI in .  At that time, his wife had just recently , and he had multiple symptoms including a sense of dyspnea, dizziness and malaise. He was evaluated at the hospital, where cardiac catheterization was performed. He was found to have double vessel CAD, for which double vessel PCI of the RCA and LAD was performed.  Catheterization was again performed in November, which revealed that the stents were patent.  At some point, given his shortness of breath, he saw a pulmonologist. More recently he has been seeing Dr. Tubbs, who has been treating incidentally noted venous thromboembolic disease, for which he had been on Xarelto.\par \par When he saw me in , he reported dyspnea, progressive over the years. PCI did not meaningfully change the course of his dyspnea. At that time, I recommended an echocardiogram and nuclear stress testing. These did not reveal any significant abnormalities.  I decided that we were likely going to require cardiac catheterization to settle things more definitively, if he did not improve.\par \par He had been found to have pulmonary emboli recently, with a more recent pulmonary embolism despite the use of Xarelto. We await confirmation of this with an outside review of his CT scans. He has therefore been taking Lovenox. He has also been found to have atrial fibrillation, rate controlled.   \par \par Because of ongoing shortness of breath, he was recently sent for cardiac catheterization. This revealed the previously placed stent in the posterolateral branch was occluded. I did not feel that this was likely to be the explanation for his dyspnea, and medical therapy was preferred.\par \par He presents to the office today with an unchanged degree of dyspnea. He does not report any other new symptoms.

## 2019-10-08 NOTE — REASON FOR VISIT
[Consultation] : a consultation regarding [Dyspnea] : dyspnea [Coronary Artery Disease] : coronary artery disease

## 2019-10-08 NOTE — PHYSICAL EXAM
[General Appearance - Well Developed] : well developed [Normal Appearance] : normal appearance [Well Groomed] : well groomed [General Appearance - Well Nourished] : well nourished [No Deformities] : no deformities [General Appearance - In No Acute Distress] : no acute distress [Normal Conjunctiva] : the conjunctiva exhibited no abnormalities [Eyelids - No Xanthelasma] : the eyelids demonstrated no xanthelasmas [Normal Oral Mucosa] : normal oral mucosa [No Oral Pallor] : no oral pallor [Normal Jugular Venous A Waves Present] : normal jugular venous A waves present [No Oral Cyanosis] : no oral cyanosis [Normal Jugular Venous V Waves Present] : normal jugular venous V waves present [No Jugular Venous Lyn A Waves] : no jugular venous lyn A waves [Exaggerated Use Of Accessory Muscles For Inspiration] : no accessory muscle use [Respiration, Rhythm And Depth] : normal respiratory rhythm and effort [Auscultation Breath Sounds / Voice Sounds] : lungs were clear to auscultation bilaterally [Abdomen Soft] : soft [Abdomen Tenderness] : non-tender [Abdomen Mass (___ Cm)] : no abdominal mass palpated [FreeTextEntry1] : Obese [Abnormal Walk] : normal gait [Gait - Sufficient For Exercise Testing] : the gait was sufficient for exercise testing [Cyanosis, Localized] : no localized cyanosis [Petechial Hemorrhages (___cm)] : no petechial hemorrhages [Nail Clubbing] : no clubbing of the fingernails [Skin Color & Pigmentation] : normal skin color and pigmentation [No Venous Stasis] : no venous stasis [] : no rash [Skin Lesions] : no skin lesions [No Skin Ulcers] : no skin ulcer [Affect] : the affect was normal [Oriented To Time, Place, And Person] : oriented to person, place, and time [No Xanthoma] : no  xanthoma was observed [Mood] : the mood was normal [No Anxiety] : not feeling anxious [Normal Rate] : normal [Not Palpable] : not palpable [Normal S1] : normal S1 [Irregularly Irregular] : irregularly irregular [No Gallop] : no gallop heard [Normal S2] : normal S2 [S4] : no S4 [S3] : no S3 [No Murmur] : no murmurs heard [Left Carotid Bruit] : no bruit heard over the left carotid [Right Carotid Bruit] : no bruit heard over the right carotid [Right Femoral Bruit] : no bruit heard over the right femoral artery [Left Femoral Bruit] : no bruit heard over the left femoral artery [No Pitting Edema] : no pitting edema present [Bruit] : no bruit heard [2+] : left 2+

## 2019-10-08 NOTE — DISCUSSION/SUMMARY
[FreeTextEntry1] : Juan Dieog has a history of coronary artery disease. He has had a history of obesity, which has been progressive. He has shortness of breath which has also been progressive.\par \par It remains unclear what the source of his shortness of breath is.  It may be a combination of obesity, deconditioning, pulmonary disease and structural heart disease. Certainly, it sounds as if revascularization did not help him in any material way, at least from back in 2012. \par \par Given that his stent is occluded, and that we cannot fully exclude a contribution from ischemic disease, we discussed several possibilities. In the end, I think a trial of medical therapy is appropriate. He will start ranolazine 500 mg 2 times daily.  He will come back and see me in one month. If there is improvement, we can either consider increasing the dose, or potentially trying again with revascularization. At present, my suspicion is low that he will have significant symptomatic relief.

## 2019-10-31 ENCOUNTER — APPOINTMENT (OUTPATIENT)
Dept: CARDIOLOGY | Facility: CLINIC | Age: 84
End: 2019-10-31
Payer: MEDICARE

## 2019-10-31 ENCOUNTER — NON-APPOINTMENT (OUTPATIENT)
Age: 84
End: 2019-10-31

## 2019-10-31 VITALS
HEIGHT: 70 IN | WEIGHT: 218 LBS | SYSTOLIC BLOOD PRESSURE: 175 MMHG | OXYGEN SATURATION: 95 % | DIASTOLIC BLOOD PRESSURE: 93 MMHG | BODY MASS INDEX: 31.21 KG/M2 | HEART RATE: 64 BPM

## 2019-10-31 PROCEDURE — 99214 OFFICE O/P EST MOD 30 MIN: CPT

## 2019-10-31 PROCEDURE — 93000 ELECTROCARDIOGRAM COMPLETE: CPT

## 2019-10-31 RX ORDER — RANOLAZINE 500 MG/1
500 TABLET, EXTENDED RELEASE ORAL
Qty: 60 | Refills: 5 | Status: DISCONTINUED | COMMUNITY
Start: 2019-10-08 | End: 2019-10-31

## 2019-10-31 NOTE — DISCUSSION/SUMMARY
[FreeTextEntry1] : Juan Diego has a history of coronary artery disease. He has had a history of obesity, which has been progressive. He has shortness of breath which has also been progressive.\par \par It remains unclear what the source of his shortness of breath is.  It may be a combination of obesity, deconditioning, pulmonary disease and structural heart disease. Certainly, it sounds as if revascularization did not help him in any material way, at least from back in 2012. \par \par Given that he did not feel any different on antianginals, I will discontinue it. I would consider this an indication that his symptoms are unlikely to be ischemic.\par \par I would suggest followup in about 3 months. He will call with questions in the meantime.

## 2019-10-31 NOTE — HISTORY OF PRESENT ILLNESS
[FreeTextEntry1] : Juan Diego Mercado presented to the office today for a cardiovascular evaluation. He was last seen in the office 3 weeks ago.\par \par He is now 84 years old, with a history of smoking in the past. He does have a history of asbestos exposure. He has a history of coronary artery disease, status post PCI in .  At that time, his wife had just recently , and he had multiple symptoms including a sense of dyspnea, dizziness and malaise. He was evaluated at the hospital, where cardiac catheterization was performed. He was found to have double vessel CAD, for which double vessel PCI of the RCA and LAD was performed.  Catheterization was again performed in November, which revealed that the stents were patent.  At some point, given his shortness of breath, he saw a pulmonologist. More recently he has been seeing Dr. Tubbs, who has been treating incidentally noted venous thromboembolic disease, for which he had been on Xarelto.\par \par When he saw me in , he reported dyspnea, progressive over the years. PCI did not meaningfully change the course of his dyspnea. At that time, I recommended an echocardiogram and nuclear stress testing. These did not reveal any significant abnormalities.  I decided that we were likely going to require cardiac catheterization to settle things more definitively, if he did not improve.\par \par He had been found to have pulmonary emboli recently, with a more recent pulmonary embolism despite the use of Xarelto. We await confirmation of this with an outside review of his CT scans. He has therefore been taking Lovenox. He has also been found to have atrial fibrillation, rate controlled.   \par \par Because of ongoing shortness of breath, he was recently sent for cardiac catheterization. This revealed the previously placed stent in the posterolateral branch was occluded. I did not feel that this was likely to be the explanation for his dyspnea, and medical therapy was preferred.\par \par At the last visit, I started her Ranexa.\par \par He presents to the office today without any clear change in his symptoms. He remains without angina, but continues to experience dyspnea. He does not report any other new symptoms.

## 2019-10-31 NOTE — PHYSICAL EXAM
[General Appearance - Well Developed] : well developed [Normal Appearance] : normal appearance [Well Groomed] : well groomed [General Appearance - Well Nourished] : well nourished [No Deformities] : no deformities [General Appearance - In No Acute Distress] : no acute distress [Normal Conjunctiva] : the conjunctiva exhibited no abnormalities [Eyelids - No Xanthelasma] : the eyelids demonstrated no xanthelasmas [Normal Oral Mucosa] : normal oral mucosa [No Oral Pallor] : no oral pallor [No Oral Cyanosis] : no oral cyanosis [Normal Jugular Venous A Waves Present] : normal jugular venous A waves present [Normal Jugular Venous V Waves Present] : normal jugular venous V waves present [No Jugular Venous Lyn A Waves] : no jugular venous lyn A waves [Respiration, Rhythm And Depth] : normal respiratory rhythm and effort [Exaggerated Use Of Accessory Muscles For Inspiration] : no accessory muscle use [Auscultation Breath Sounds / Voice Sounds] : lungs were clear to auscultation bilaterally [Abdomen Soft] : soft [Abdomen Tenderness] : non-tender [Abdomen Mass (___ Cm)] : no abdominal mass palpated [FreeTextEntry1] : Obese [Abnormal Walk] : normal gait [Gait - Sufficient For Exercise Testing] : the gait was sufficient for exercise testing [Nail Clubbing] : no clubbing of the fingernails [Cyanosis, Localized] : no localized cyanosis [Petechial Hemorrhages (___cm)] : no petechial hemorrhages [Skin Color & Pigmentation] : normal skin color and pigmentation [] : no rash [No Venous Stasis] : no venous stasis [Skin Lesions] : no skin lesions [No Skin Ulcers] : no skin ulcer [No Xanthoma] : no  xanthoma was observed [Oriented To Time, Place, And Person] : oriented to person, place, and time [Affect] : the affect was normal [Mood] : the mood was normal [No Anxiety] : not feeling anxious [Not Palpable] : not palpable [Normal Rate] : normal [Irregularly Irregular] : irregularly irregular [Normal S1] : normal S1 [Normal S2] : normal S2 [No Gallop] : no gallop heard [S3] : no S3 [S4] : no S4 [No Murmur] : no murmurs heard [Right Carotid Bruit] : no bruit heard over the right carotid [Left Carotid Bruit] : no bruit heard over the left carotid [Right Femoral Bruit] : no bruit heard over the right femoral artery [Left Femoral Bruit] : no bruit heard over the left femoral artery [2+] : left 2+ [Bruit] : no bruit heard [No Pitting Edema] : no pitting edema present

## 2019-12-26 ENCOUNTER — RX RENEWAL (OUTPATIENT)
Age: 84
End: 2019-12-26

## 2020-01-08 ENCOUNTER — APPOINTMENT (OUTPATIENT)
Dept: PULMONOLOGY | Facility: CLINIC | Age: 85
End: 2020-01-08
Payer: MEDICARE

## 2020-01-08 VITALS
WEIGHT: 270 LBS | HEART RATE: 85 BPM | DIASTOLIC BLOOD PRESSURE: 75 MMHG | HEIGHT: 70 IN | BODY MASS INDEX: 38.65 KG/M2 | OXYGEN SATURATION: 96 % | SYSTOLIC BLOOD PRESSURE: 118 MMHG

## 2020-01-08 PROCEDURE — 99214 OFFICE O/P EST MOD 30 MIN: CPT | Mod: 25

## 2020-01-08 PROCEDURE — 94618 PULMONARY STRESS TESTING: CPT

## 2020-01-08 PROCEDURE — 94617 EXERCISE TST BRNCSPSM W/ECG: CPT

## 2020-01-08 PROCEDURE — 71046 X-RAY EXAM CHEST 2 VIEWS: CPT

## 2020-01-08 NOTE — PHYSICAL EXAM
[General Appearance - Well Developed] : well developed [Normal Appearance] : normal appearance [Well Groomed] : well groomed [General Appearance - In No Acute Distress] : no acute distress [No Deformities] : no deformities [General Appearance - Well Nourished] : well nourished [Normal Conjunctiva] : the conjunctiva exhibited no abnormalities [Eyelids - No Xanthelasma] : the eyelids demonstrated no xanthelasmas [Normal Oropharynx] : abnormal oropharynx [Low Lying Soft Palate] : low lying soft palate [Elongated Uvula] : elongated uvula [Enlarged Base of the Tongue] : enlargement of the base of the tongue [Heart Rate And Rhythm] : heart rate and rhythm were normal [Heart Sounds] : normal S1 and S2 [Murmurs] : no murmurs present [Respiration, Rhythm And Depth] : normal respiratory rhythm and effort [Auscultation Breath Sounds / Voice Sounds] : lungs were clear to auscultation bilaterally [Exaggerated Use Of Accessory Muscles For Inspiration] : no accessory muscle use [Abdomen Tenderness] : non-tender [Abdomen Soft] : soft [Abnormal Walk] : normal gait [Abdomen Mass (___ Cm)] : no abdominal mass palpated [Gait - Sufficient For Exercise Testing] : the gait was sufficient for exercise testing [Skin Color & Pigmentation] : normal skin color and pigmentation [No Venous Stasis] : no venous stasis [] : no rash [Skin Lesions] : no skin lesions [No Skin Ulcers] : no skin ulcer [No Xanthoma] : no  xanthoma was observed [Sensation] : the sensory exam was normal to light touch and pinprick [Deep Tendon Reflexes (DTR)] : deep tendon reflexes were 2+ and symmetric [No Focal Deficits] : no focal deficits

## 2020-01-08 NOTE — ASSESSMENT
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 \par I am repeating his labs. And I may repeat his chest CT\par He does qualify for oxygen based on oxygen desaturation and diagnosis of lymphangitic carcinomatosis

## 2020-01-08 NOTE — PROCEDURE
[FreeTextEntry1] : Walking oximetry demonstrates significant oxygen desaturation with exertion with marked increase in heart rate\par Baseline oxygen saturation 95%, after 4 minutes of exertion oxygen desaturation to 87% noted with heart rate of a 182\par \par patient placed on 3 L per minute walking test repeated\par baseline saturation 96%, O2 saturation maintained at 95%\par \par

## 2020-01-08 NOTE — HISTORY OF PRESENT ILLNESS
[FreeTextEntry1] : \par History of prostate cancer\par \par History of incidental pulmonary embolus\par \par Unexplained shortness of breath. Underwent cardiac catheterization which revealed occluded stent but this not felt to be cause of dyspnea\par \par \par Shortness of breath continues despite treatment with Lovenox. Exertional dyspnea worsening\par Was taken off hormonal therapy for prostate cancer temporarily which did not relieve his symptoms

## 2020-01-09 ENCOUNTER — TRANSCRIPTION ENCOUNTER (OUTPATIENT)
Age: 85
End: 2020-01-09

## 2020-01-09 LAB
ALBUMIN SERPL ELPH-MCNC: 4.3 G/DL
ALP BLD-CCNC: 50 U/L
ALT SERPL-CCNC: 10 U/L
ANION GAP SERPL CALC-SCNC: 21 MMOL/L
AST SERPL-CCNC: 16 U/L
BASOPHILS # BLD AUTO: 0.02 K/UL
BASOPHILS NFR BLD AUTO: 0.3 %
BILIRUB SERPL-MCNC: 0.5 MG/DL
BUN SERPL-MCNC: 17 MG/DL
CALCIUM SERPL-MCNC: 9.8 MG/DL
CHLORIDE SERPL-SCNC: 104 MMOL/L
CO2 SERPL-SCNC: 20 MMOL/L
CREAT SERPL-MCNC: 0.92 MG/DL
DEPRECATED D DIMER PPP IA-ACNC: <150 NG/ML DDU
EOSINOPHIL # BLD AUTO: 0.02 K/UL
EOSINOPHIL NFR BLD AUTO: 0.3 %
GLUCOSE SERPL-MCNC: 108 MG/DL
HCT VFR BLD CALC: 44.8 %
HGB BLD-MCNC: 14.4 G/DL
IMM GRANULOCYTES NFR BLD AUTO: 0.7 %
LYMPHOCYTES # BLD AUTO: 0.69 K/UL
LYMPHOCYTES NFR BLD AUTO: 11.6 %
MAN DIFF?: NORMAL
MCHC RBC-ENTMCNC: 30.3 PG
MCHC RBC-ENTMCNC: 32.1 GM/DL
MCV RBC AUTO: 94.1 FL
MONOCYTES # BLD AUTO: 0.39 K/UL
MONOCYTES NFR BLD AUTO: 6.6 %
NEUTROPHILS # BLD AUTO: 4.79 K/UL
NEUTROPHILS NFR BLD AUTO: 80.5 %
NT-PROBNP SERPL-MCNC: 814 PG/ML
PLATELET # BLD AUTO: 181 K/UL
POTASSIUM SERPL-SCNC: 5 MMOL/L
PROT SERPL-MCNC: 6.5 G/DL
RBC # BLD: 4.76 M/UL
RBC # FLD: 15.5 %
SODIUM SERPL-SCNC: 145 MMOL/L
WBC # FLD AUTO: 5.95 K/UL

## 2020-01-25 ENCOUNTER — MOBILE ON CALL (OUTPATIENT)
Age: 85
End: 2020-01-25

## 2020-01-30 ENCOUNTER — FORM ENCOUNTER (OUTPATIENT)
Age: 85
End: 2020-01-30

## 2020-01-31 ENCOUNTER — APPOINTMENT (OUTPATIENT)
Dept: CT IMAGING | Facility: CLINIC | Age: 85
End: 2020-01-31
Payer: MEDICARE

## 2020-01-31 ENCOUNTER — NON-APPOINTMENT (OUTPATIENT)
Age: 85
End: 2020-01-31

## 2020-01-31 ENCOUNTER — OUTPATIENT (OUTPATIENT)
Dept: OUTPATIENT SERVICES | Facility: HOSPITAL | Age: 85
LOS: 1 days | End: 2020-01-31
Payer: MEDICARE

## 2020-01-31 ENCOUNTER — APPOINTMENT (OUTPATIENT)
Dept: CARDIOLOGY | Facility: CLINIC | Age: 85
End: 2020-01-31
Payer: MEDICARE

## 2020-01-31 VITALS
WEIGHT: 219 LBS | OXYGEN SATURATION: 98 % | SYSTOLIC BLOOD PRESSURE: 128 MMHG | DIASTOLIC BLOOD PRESSURE: 80 MMHG | HEIGHT: 70 IN | HEART RATE: 60 BPM | BODY MASS INDEX: 31.35 KG/M2

## 2020-01-31 DIAGNOSIS — Z95.5 PRESENCE OF CORONARY ANGIOPLASTY IMPLANT AND GRAFT: Chronic | ICD-10-CM

## 2020-01-31 DIAGNOSIS — Z90.49 ACQUIRED ABSENCE OF OTHER SPECIFIED PARTS OF DIGESTIVE TRACT: Chronic | ICD-10-CM

## 2020-01-31 DIAGNOSIS — R06.00 DYSPNEA, UNSPECIFIED: ICD-10-CM

## 2020-01-31 PROCEDURE — 93000 ELECTROCARDIOGRAM COMPLETE: CPT

## 2020-01-31 PROCEDURE — 71275 CT ANGIOGRAPHY CHEST: CPT

## 2020-01-31 PROCEDURE — 99214 OFFICE O/P EST MOD 30 MIN: CPT

## 2020-01-31 PROCEDURE — 71275 CT ANGIOGRAPHY CHEST: CPT | Mod: 26

## 2020-01-31 NOTE — DISCUSSION/SUMMARY
[FreeTextEntry1] : Juan Diego has a history of coronary artery disease. He has had a history of obesity, which has been progressive. He has shortness of breath which has also been progressive.\par \par It remains unclear what the source of his shortness of breath is.  It may be a combination of obesity, deconditioning, pulmonary disease and structural heart disease. Certainly, it sounds as if revascularization did not help him in any material way, at least from back in 2012. \par \par He did not feel any different on antianginals.  Ischemia seems to be an insignificant aspect of things now.\par  \par \par I would suggest followup in about 6 months. He will call with questions in the meantime.

## 2020-01-31 NOTE — HISTORY OF PRESENT ILLNESS
[FreeTextEntry1] : Juan Diego Mercado presented to the office today for a cardiovascular evaluation. He was last seen in the office 3 months ago.\par \par He is now 84 years old, with a history of smoking in the past. He does have a history of asbestos exposure. He has a history of coronary artery disease, status post PCI in .  At that time, his wife had just recently , and he had multiple symptoms including a sense of dyspnea, dizziness and malaise. He was evaluated at the hospital, where cardiac catheterization was performed. He was found to have double vessel CAD, for which double vessel PCI of the RCA and LAD was performed.  Catheterization was again performed in November, which revealed that the stents were patent.  At some point, given his shortness of breath, he saw a pulmonologist. More recently he has been seeing Dr. Tubbs, who has been treating incidentally noted venous thromboembolic disease, for which he had been on Xarelto, now on Lovenox given concerns about a recurrence.\par \par When he saw me in , he reported dyspnea, progressive over the years. PCI did not meaningfully change the course of his dyspnea. At that time, I recommended an echocardiogram and nuclear stress testing. These did not reveal any significant abnormalities.  I decided that we were likely going to require cardiac catheterization to settle things more definitively, if he did not improve.\par \par He had been found to have pulmonary emboli recently, with a more recent pulmonary embolism despite the use of Xarelto.   He has therefore been taking Lovenox, with plans to transition to Pradaxa. He has also been found to have atrial fibrillation, rate controlled.   \par \par Because of ongoing shortness of breath, he was recently sent for cardiac catheterization. This revealed the previously placed stent in the posterolateral branch was occluded. I did not feel that this was likely to be the explanation for his dyspnea, and medical therapy was preferred. He did not respond to anti-anginal medication at all.\par \par He presents to the office today without any clear change in his symptoms. He remains without angina, but continues to experience dyspnea. He does not report any other new symptoms.

## 2020-02-19 ENCOUNTER — APPOINTMENT (OUTPATIENT)
Dept: PULMONOLOGY | Facility: CLINIC | Age: 85
End: 2020-02-19
Payer: MEDICARE

## 2020-02-19 VITALS
OXYGEN SATURATION: 94 % | WEIGHT: 217 LBS | BODY MASS INDEX: 31.07 KG/M2 | HEIGHT: 70 IN | DIASTOLIC BLOOD PRESSURE: 79 MMHG | HEART RATE: 86 BPM | SYSTOLIC BLOOD PRESSURE: 115 MMHG

## 2020-02-19 DIAGNOSIS — R06.02 SHORTNESS OF BREATH: ICD-10-CM

## 2020-02-19 PROCEDURE — 99214 OFFICE O/P EST MOD 30 MIN: CPT | Mod: 25

## 2020-02-19 PROCEDURE — 94618 PULMONARY STRESS TESTING: CPT

## 2020-02-19 RX ORDER — DABIGATRAN ETEXILATE MESYLATE 75 MG/1
75 CAPSULE ORAL TWICE DAILY
Qty: 60 | Refills: 3 | Status: DISCONTINUED | COMMUNITY
Start: 2020-01-08 | End: 2020-02-19

## 2020-02-19 RX ORDER — ENOXAPARIN SODIUM 100 MG/ML
100 INJECTION SUBCUTANEOUS
Qty: 60 | Refills: 5 | Status: DISCONTINUED | COMMUNITY
Start: 2019-07-08 | End: 2020-02-19

## 2020-02-19 NOTE — PHYSICAL EXAM
[No Acute Distress] : no acute distress [Normal Oropharynx] : normal oropharynx [Normal Rate/Rhythm] : normal rate/rhythm [No Neck Mass] : no neck mass [Normal Appearance] : normal appearance [No Murmurs] : no murmurs [Normal S1, S2] : normal s1, s2 [Clear to Auscultation Bilaterally] : clear to auscultation bilaterally [No Resp Distress] : no resp distress [No Abnormalities] : no abnormalities [Benign] : benign [Normal Gait] : normal gait [No Clubbing] : no clubbing [No Edema] : no edema [No Cyanosis] : no cyanosis [FROM] : FROM [Oriented x3] : oriented x3 [No Focal Deficits] : no focal deficits [Normal Color/ Pigmentation] : normal color/ pigmentation [Normal Affect] : normal affect

## 2020-02-19 NOTE — REASON FOR VISIT
[Pulmonary Embolism] : pulmonary embolism [Follow-Up] : a follow-up visit [Shortness of Breath] : shortness of breath

## 2020-02-19 NOTE — ASSESSMENT
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

## 2020-02-19 NOTE — HISTORY OF PRESENT ILLNESS
[TextBox_4] : Followup for shortness of breath.\par Reevaluated for pulmonary thromboembolic disease\par Had original scans reviewed and felt diagnosis inclusive at best\par Off Lovenox back on Xarelto\par No change in chronic dyspnea

## 2020-03-24 ENCOUNTER — RX RENEWAL (OUTPATIENT)
Age: 85
End: 2020-03-24

## 2020-05-20 ENCOUNTER — APPOINTMENT (OUTPATIENT)
Dept: PULMONOLOGY | Facility: CLINIC | Age: 85
End: 2020-05-20

## 2020-06-18 ENCOUNTER — RX RENEWAL (OUTPATIENT)
Age: 85
End: 2020-06-18

## 2020-07-13 ENCOUNTER — RX RENEWAL (OUTPATIENT)
Age: 85
End: 2020-07-13

## 2020-09-08 ENCOUNTER — NON-APPOINTMENT (OUTPATIENT)
Age: 85
End: 2020-09-08

## 2020-09-08 ENCOUNTER — APPOINTMENT (OUTPATIENT)
Dept: CARDIOLOGY | Facility: CLINIC | Age: 85
End: 2020-09-08
Payer: MEDICARE

## 2020-09-08 VITALS
BODY MASS INDEX: 30.49 KG/M2 | SYSTOLIC BLOOD PRESSURE: 161 MMHG | DIASTOLIC BLOOD PRESSURE: 86 MMHG | HEIGHT: 70 IN | OXYGEN SATURATION: 94 % | HEART RATE: 51 BPM | WEIGHT: 213 LBS

## 2020-09-08 PROCEDURE — 93000 ELECTROCARDIOGRAM COMPLETE: CPT

## 2020-09-08 PROCEDURE — 99214 OFFICE O/P EST MOD 30 MIN: CPT

## 2020-09-08 NOTE — HISTORY OF PRESENT ILLNESS
[FreeTextEntry1] : Juan Diego Mercado presented to the office today for a cardiovascular evaluation. He was last seen in the office 6 months ago.\par \par He is now 85 years old, with a history of smoking in the past. He does have a history of asbestos exposure. He has a history of coronary artery disease, status post PCI in .  At that time, his wife had just recently , and he had multiple symptoms including a sense of dyspnea, dizziness and malaise. He was evaluated at the hospital, where cardiac catheterization was performed. He was found to have double vessel CAD, for which double vessel PCI of the RCA and LAD was performed.  Catheterization was again performed in November, which revealed that the stents were patent.  At some point, given his shortness of breath, he saw a pulmonologist. More recently he has been seeing Dr. Tubbs, who has been treating incidentally noted venous thromboembolic disease, for which he had been on Xarelto, now on Lovenox given concerns about a recurrence.\par \par When he saw me in , he reported dyspnea, progressive over the years. PCI did not meaningfully change the course of his dyspnea. At that time, I recommended an echocardiogram and nuclear stress testing. These did not reveal any significant abnormalities.  I decided that we were likely going to require cardiac catheterization to settle things more definitively, if he did not improve.\par \par He had been found to have pulmonary emboli recently, with a more recent pulmonary embolism despite the use of Xarelto.   He has therefore been taking Lovenox, with plans to transition to Pradaxa. He has also been found to have atrial fibrillation, rate controlled.   \par \par Because of ongoing shortness of breath, he was recently sent for cardiac catheterization. This revealed the previously placed stent in the posterolateral branch was occluded. I did not feel that this was likely to be the explanation for his dyspnea, and medical therapy was preferred. He did not respond to anti-anginal medication at all.\par \par He presents to the office today without any clear change in his symptoms. He remains without angina, but continues to experience dyspnea. He does not report any other new symptoms.

## 2020-09-08 NOTE — PHYSICAL EXAM
[Normal Appearance] : normal appearance [General Appearance - Well Developed] : well developed [Well Groomed] : well groomed [No Deformities] : no deformities [General Appearance - In No Acute Distress] : no acute distress [General Appearance - Well Nourished] : well nourished [Eyelids - No Xanthelasma] : the eyelids demonstrated no xanthelasmas [Normal Conjunctiva] : the conjunctiva exhibited no abnormalities [No Oral Cyanosis] : no oral cyanosis [Normal Oral Mucosa] : normal oral mucosa [No Oral Pallor] : no oral pallor [Normal Jugular Venous V Waves Present] : normal jugular venous V waves present [No Jugular Venous Lyn A Waves] : no jugular venous lyn A waves [Normal Jugular Venous A Waves Present] : normal jugular venous A waves present [Exaggerated Use Of Accessory Muscles For Inspiration] : no accessory muscle use [Respiration, Rhythm And Depth] : normal respiratory rhythm and effort [Auscultation Breath Sounds / Voice Sounds] : lungs were clear to auscultation bilaterally [Abdomen Soft] : soft [Abdomen Tenderness] : non-tender [FreeTextEntry1] : Obese [Abdomen Mass (___ Cm)] : no abdominal mass palpated [Abnormal Walk] : normal gait [Nail Clubbing] : no clubbing of the fingernails [Gait - Sufficient For Exercise Testing] : the gait was sufficient for exercise testing [Cyanosis, Localized] : no localized cyanosis [Skin Color & Pigmentation] : normal skin color and pigmentation [Petechial Hemorrhages (___cm)] : no petechial hemorrhages [] : no rash [Skin Lesions] : no skin lesions [No Venous Stasis] : no venous stasis [No Skin Ulcers] : no skin ulcer [Oriented To Time, Place, And Person] : oriented to person, place, and time [No Xanthoma] : no  xanthoma was observed [Affect] : the affect was normal [No Anxiety] : not feeling anxious [Mood] : the mood was normal [Not Palpable] : not palpable [Rhythm Regular] : regular [Normal Rate] : normal [Normal S2] : normal S2 [No Gallop] : no gallop heard [Normal S1] : normal S1 [S3] : no S3 [S4] : no S4 [No Murmur] : no murmurs heard [Right Femoral Bruit] : no bruit heard over the right femoral artery [Left Carotid Bruit] : no bruit heard over the left carotid [Right Carotid Bruit] : no bruit heard over the right carotid [2+] : left 2+ [Left Femoral Bruit] : no bruit heard over the left femoral artery [No Pitting Edema] : no pitting edema present [Bruit] : no bruit heard

## 2020-09-23 ENCOUNTER — RX RENEWAL (OUTPATIENT)
Age: 85
End: 2020-09-23

## 2020-12-28 ENCOUNTER — APPOINTMENT (OUTPATIENT)
Dept: PULMONOLOGY | Facility: CLINIC | Age: 85
End: 2020-12-28
Payer: MEDICARE

## 2020-12-28 ENCOUNTER — RX RENEWAL (OUTPATIENT)
Age: 85
End: 2020-12-28

## 2020-12-28 DIAGNOSIS — C61 MALIGNANT NEOPLASM OF PROSTATE: ICD-10-CM

## 2020-12-28 DIAGNOSIS — J45.909 UNSPECIFIED ASTHMA, UNCOMPLICATED: ICD-10-CM

## 2020-12-28 DIAGNOSIS — I26.99 OTHER PULMONARY EMBOLISM W/OUT ACUTE COR PULMONALE: ICD-10-CM

## 2020-12-28 DIAGNOSIS — J44.9 CHRONIC OBSTRUCTIVE PULMONARY DISEASE, UNSPECIFIED: ICD-10-CM

## 2020-12-28 DIAGNOSIS — R06.00 DYSPNEA, UNSPECIFIED: ICD-10-CM

## 2020-12-28 PROCEDURE — 99213 OFFICE O/P EST LOW 20 MIN: CPT | Mod: 95

## 2020-12-30 NOTE — REASON FOR VISIT
[Follow-Up] : a follow-up visit [Home] : at home, [unfilled] , at the time of the visit. [Medical Office: ([facility])___] : at the medical office located in  [Family Member] : family member [Verbal consent obtained from patient] : the patient, [unfilled]

## 2020-12-30 NOTE — ASSESSMENT
[FreeTextEntry1] : Continue current treatment.  Requested copies of lab work from upcoming oncology appointment

## 2020-12-30 NOTE — HISTORY OF PRESENT ILLNESS
[Never] : never [TextBox_4] : Follow-up for chronic shortness of breath.  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  He developed severe weakness and could not get out of the house.  Apparently he had stopped his prednisone.  He is back on half dose 5 mg daily.  He is feeling a little bit better.  He was hospitalized for shortness of breath and had another negative work-up.  He has a scheduled appointment with his oncologist for evaluation of treatment\par \par \par Requesting renewal of anticoagulation medication\par \par

## 2021-02-16 ENCOUNTER — RX RENEWAL (OUTPATIENT)
Age: 86
End: 2021-02-16

## 2021-03-15 ENCOUNTER — RX RENEWAL (OUTPATIENT)
Age: 86
End: 2021-03-15

## 2021-03-17 ENCOUNTER — APPOINTMENT (OUTPATIENT)
Dept: CARDIOLOGY | Facility: CLINIC | Age: 86
End: 2021-03-17

## 2021-04-12 ENCOUNTER — RX RENEWAL (OUTPATIENT)
Age: 86
End: 2021-04-12

## 2021-07-31 ENCOUNTER — RX RENEWAL (OUTPATIENT)
Age: 86
End: 2021-07-31

## 2021-07-31 RX ORDER — RIVAROXABAN 20 MG/1
20 TABLET, FILM COATED ORAL
Qty: 30 | Refills: 4 | Status: ACTIVE | COMMUNITY
Start: 2020-01-26 | End: 1900-01-01

## 2021-08-23 ENCOUNTER — RX RENEWAL (OUTPATIENT)
Age: 86
End: 2021-08-23

## 2021-08-23 RX ORDER — MONTELUKAST 10 MG/1
10 TABLET, FILM COATED ORAL
Qty: 90 | Refills: 3 | Status: ACTIVE | COMMUNITY
Start: 2018-07-30 | End: 1900-01-01

## 2024-04-22 NOTE — PHYSICAL EXAM
Post-Care Instructions: I reviewed with the patient in detail post-care instructions. Patient is to wear sunprotection, and avoid picking at any of the treated lesions. Pt may apply Vaseline to crusted or scabbing areas. Render Post-Care Instructions In Note?: no Consent: The patient's consent was obtained including but not limited to risks of crusting, scabbing, blistering, scarring, darker or lighter pigmentary change, recurrence, incomplete removal and infection. Show Applicator Variable?: Yes [General Appearance - Well Developed] : well developed Duration Of Freeze Thaw-Cycle (Seconds): 0 [Normal Appearance] : normal appearance Detail Level: Detailed [Well Groomed] : well groomed [General Appearance - Well Nourished] : well nourished [No Deformities] : no deformities [General Appearance - In No Acute Distress] : no acute distress [Normal Conjunctiva] : the conjunctiva exhibited no abnormalities [Eyelids - No Xanthelasma] : the eyelids demonstrated no xanthelasmas [Normal Oral Mucosa] : normal oral mucosa [No Oral Pallor] : no oral pallor [Normal Jugular Venous A Waves Present] : normal jugular venous A waves present [No Oral Cyanosis] : no oral cyanosis [Normal Jugular Venous V Waves Present] : normal jugular venous V waves present [No Jugular Venous Lyn A Waves] : no jugular venous lyn A waves [Respiration, Rhythm And Depth] : normal respiratory rhythm and effort [Exaggerated Use Of Accessory Muscles For Inspiration] : no accessory muscle use [Auscultation Breath Sounds / Voice Sounds] : lungs were clear to auscultation bilaterally [Abdomen Soft] : soft [Abdomen Tenderness] : non-tender [FreeTextEntry1] : Obese [Abdomen Mass (___ Cm)] : no abdominal mass palpated [Gait - Sufficient For Exercise Testing] : the gait was sufficient for exercise testing [Abnormal Walk] : normal gait [Nail Clubbing] : no clubbing of the fingernails [Cyanosis, Localized] : no localized cyanosis [Petechial Hemorrhages (___cm)] : no petechial hemorrhages [] : no rash [Skin Color & Pigmentation] : normal skin color and pigmentation [No Venous Stasis] : no venous stasis [Skin Lesions] : no skin lesions [No Skin Ulcers] : no skin ulcer [No Xanthoma] : no  xanthoma was observed [Oriented To Time, Place, And Person] : oriented to person, place, and time [Mood] : the mood was normal [Affect] : the affect was normal [No Anxiety] : not feeling anxious [Not Palpable] : not palpable [Rhythm Regular] : regular [Normal Rate] : normal [Normal S2] : normal S2 [Normal S1] : normal S1 [No Gallop] : no gallop heard [S3] : no S3 [S4] : no S4 [No Murmur] : no murmurs heard [Right Carotid Bruit] : no bruit heard over the right carotid [Right Femoral Bruit] : no bruit heard over the right femoral artery [Left Carotid Bruit] : no bruit heard over the left carotid [Left Femoral Bruit] : no bruit heard over the left femoral artery [2+] : left 2+ [Bruit] : no bruit heard [No Pitting Edema] : no pitting edema present